# Patient Record
Sex: FEMALE | Race: WHITE
[De-identification: names, ages, dates, MRNs, and addresses within clinical notes are randomized per-mention and may not be internally consistent; named-entity substitution may affect disease eponyms.]

---

## 2019-10-31 ENCOUNTER — HOSPITAL ENCOUNTER (EMERGENCY)
Dept: HOSPITAL 94 - ER | Age: 39
Discharge: HOME | End: 2019-10-31
Payer: MEDICAID

## 2019-10-31 VITALS — BODY MASS INDEX: 33.03 KG/M2 | WEIGHT: 210.43 LBS | HEIGHT: 67 IN

## 2019-10-31 VITALS — SYSTOLIC BLOOD PRESSURE: 123 MMHG | DIASTOLIC BLOOD PRESSURE: 65 MMHG

## 2019-10-31 DIAGNOSIS — Z79.899: ICD-10-CM

## 2019-10-31 DIAGNOSIS — Z88.8: ICD-10-CM

## 2019-10-31 DIAGNOSIS — R05: Primary | ICD-10-CM

## 2019-10-31 DIAGNOSIS — R06.02: ICD-10-CM

## 2019-10-31 PROCEDURE — 71045 X-RAY EXAM CHEST 1 VIEW: CPT

## 2019-10-31 PROCEDURE — 99283 EMERGENCY DEPT VISIT LOW MDM: CPT

## 2020-08-12 ENCOUNTER — HOSPITAL ENCOUNTER (EMERGENCY)
Dept: HOSPITAL 94 - ER | Age: 40
LOS: 1 days | Discharge: HOME | End: 2020-08-13
Payer: MEDICAID

## 2020-08-12 VITALS — HEIGHT: 67 IN | WEIGHT: 230.49 LBS | BODY MASS INDEX: 36.18 KG/M2

## 2020-08-12 DIAGNOSIS — I49.9: Primary | ICD-10-CM

## 2020-08-12 DIAGNOSIS — Z88.8: ICD-10-CM

## 2020-08-12 DIAGNOSIS — R00.2: ICD-10-CM

## 2020-08-12 DIAGNOSIS — Z79.899: ICD-10-CM

## 2020-08-12 DIAGNOSIS — F41.9: ICD-10-CM

## 2020-08-12 PROCEDURE — 99283 EMERGENCY DEPT VISIT LOW MDM: CPT

## 2020-08-12 PROCEDURE — 93005 ELECTROCARDIOGRAM TRACING: CPT

## 2020-08-13 VITALS — DIASTOLIC BLOOD PRESSURE: 74 MMHG | SYSTOLIC BLOOD PRESSURE: 106 MMHG

## 2020-08-13 NOTE — NUR
PATIENT IN ROOM COVERS ON EYES CLOSED RR EVEN UN LABORED NO OBSERVABLE S/S OF 
ACUTE STRESS AT THIS TIME WILL CONTINUE TO MONITOR

## 2020-08-13 NOTE — NUR
DR. TRUONG REQUESTED TO CAPTURE AN ARRYTHMIA POSSIBLY A-FIB, PRINTED OUT 2 STRIPS 
NSR WITH ECTOPY PER DR. PELLETIER

## 2020-08-13 NOTE — NUR
DR. TRUONG AT BEDSIDE ASSESSING PATIENT  VERBALIZED TO HOLD OFF ON LABS PATIENT 
VERBALIZED GETTING LABS THIS A.M.